# Patient Record
Sex: FEMALE | Race: WHITE | NOT HISPANIC OR LATINO | Employment: UNEMPLOYED | URBAN - NONMETROPOLITAN AREA
[De-identification: names, ages, dates, MRNs, and addresses within clinical notes are randomized per-mention and may not be internally consistent; named-entity substitution may affect disease eponyms.]

---

## 2020-08-21 ENCOUNTER — OFFICE VISIT (OUTPATIENT)
Dept: FAMILY MEDICINE CLINIC | Facility: CLINIC | Age: 25
End: 2020-08-21

## 2020-08-21 VITALS
SYSTOLIC BLOOD PRESSURE: 84 MMHG | HEART RATE: 113 BPM | WEIGHT: 159 LBS | OXYGEN SATURATION: 98 % | HEIGHT: 69 IN | DIASTOLIC BLOOD PRESSURE: 48 MMHG | TEMPERATURE: 97.8 F | BODY MASS INDEX: 23.55 KG/M2

## 2020-08-21 DIAGNOSIS — M79.672 LEFT FOOT PAIN: ICD-10-CM

## 2020-08-21 DIAGNOSIS — K90.0 CELIAC DISEASE: ICD-10-CM

## 2020-08-21 DIAGNOSIS — Z30.09 FAMILY PLANNING: Primary | ICD-10-CM

## 2020-08-21 DIAGNOSIS — D50.8 OTHER IRON DEFICIENCY ANEMIA: ICD-10-CM

## 2020-08-21 PROCEDURE — 86769 SARS-COV-2 COVID-19 ANTIBODY: CPT | Performed by: NURSE PRACTITIONER

## 2020-08-21 PROCEDURE — 36415 COLL VENOUS BLD VENIPUNCTURE: CPT | Performed by: NURSE PRACTITIONER

## 2020-08-21 PROCEDURE — 99203 OFFICE O/P NEW LOW 30 MIN: CPT | Performed by: NURSE PRACTITIONER

## 2020-08-21 RX ORDER — CYANOCOBALAMIN (VITAMIN B-12) 250 MCG
250 TABLET ORAL DAILY
COMMUNITY
End: 2022-07-27 | Stop reason: SDDI

## 2020-08-21 RX ORDER — NORGESTIMATE AND ETHINYL ESTRADIOL 0.25-0.035
1 KIT ORAL DAILY
Qty: 28 TABLET | Refills: 12 | Status: SHIPPED | OUTPATIENT
Start: 2020-08-21

## 2020-08-21 NOTE — PROGRESS NOTES
Subjective   Sandrita Khan is a 25 y.o. female.     Chief Complaint: Establish Care and Anemia    History of Present Illness   Pt is here today to establish care.  She has been following with a provider in [place] and she has now moved to this area.  She is in the .   Patient does have a hx of iron def anemia, celiac disease.   Childbirth in 2017.  Appendectomy 2018.   Colonoscopy and EGD in 2019.  She does have celiac disease.  She does have a hx of anemia.  She is currently taking iron supplementation BiD.  She was told to use B12 otc also.  She takes this every morning.    Last pap smear in June 2020.  Abnormal findings.  Does need a follow up pap done.   She had labs drawn in June 2020 and was told that her labs were perfectly normal now.  She plans to continue taking her iron supplementation.   She is currently on gluten free diet.     Pt states that she had fever, cough, shortness of breath and fatigue about two months ago.  She was tested for influenza and strep which was negative.  She was not tested for coronavirus and would like to be tested for antibodies if possible.     Left foot Pain  She states that she was running and felt that the back of the foot buckled and had pain in the back of her foot.  She continues to have pain in the foot.  Slightly improving but continues to hurt.  She did have some N/T in the bottom of her foot the first few days; but it has resolved now.  She does have pain in the foot with driving also.        Family History   Problem Relation Age of Onset   • Hypertension Mother    • Thyroid cancer Father        Social History     Socioeconomic History   • Marital status:      Spouse name: Not on file   • Number of children: Not on file   • Years of education: Not on file   • Highest education level: Not on file   Tobacco Use   • Smoking status: Never Smoker   • Smokeless tobacco: Never Used   Substance and Sexual Activity   • Alcohol use: Yes     Comment: social   "  • Drug use: Never   • Sexual activity: Not Currently     Birth control/protection: Pill       Past Medical History:   Diagnosis Date   • Abnormal Pap smear of cervix    • Anemia    • Celiac disease    • Nerve damage of foot        Review of Systems   Constitutional: Negative.    HENT: Negative.    Respiratory: Negative.    Cardiovascular: Negative.    Gastrointestinal: Negative.    Musculoskeletal: Negative.    Skin: Negative.    Neurological: Negative.    Psychiatric/Behavioral: Negative.        Objective   Physical Exam   Constitutional: She is oriented to person, place, and time. She appears well-developed and well-nourished.   Neck: Normal range of motion. Neck supple.   Cardiovascular: Normal rate, regular rhythm and normal heart sounds.   Pulmonary/Chest: Effort normal and breath sounds normal.   Musculoskeletal:   Mildly tender achilles tendon   Neurological: She is alert and oriented to person, place, and time.   Skin: Skin is warm and dry.   Psychiatric: She has a normal mood and affect. Her behavior is normal. Judgment and thought content normal.   Nursing note and vitals reviewed.      Procedures    Vitals: Blood pressure (!) 84/48, pulse 113, temperature 97.8 °F (36.6 °C), height 175.3 cm (69\"), weight 72.1 kg (159 lb), SpO2 98 %, not currently breastfeeding.    Body mass index is 23.48 kg/m².     Allergies: No Known Allergies     During this visit the following were done:  Labs Reviewed []    Labs Ordered []    Radiology Reports Reviewed []    Radiology Ordered []    PCP Records Reviewed []    Referring Provider Records Reviewed []    ER Records Reviewed []    Hospital Records Reviewed []    History Obtained From Family []    Radiology Images Reviewed []    Other Reviewed []    Records Requested []      Assessment/Plan   Sandrita was seen today for establish care and anemia.    Diagnoses and all orders for this visit:    Family planning  -     Continue norgestimate-ethinyl estradiol (Sprintec 28) " 0.25-35 MG-MCG per tablet; Take 1 tablet by mouth Daily.    Celiac disease    Other iron deficiency anemia   Continue iron supplementation    Left foot pain   observie for now.    RTC 2 weeks for pap

## 2020-08-22 LAB
ABBOTT SARS-COV-2 AB, IGG: NORMAL
Lab: NEGATIVE
SARS-COV-2 IGM SERPL QL IA: NEGATIVE

## 2020-08-23 LAB — SARS-COV-2 IGA SERPL QL IA: NEGATIVE

## 2020-09-04 ENCOUNTER — OFFICE VISIT (OUTPATIENT)
Dept: FAMILY MEDICINE CLINIC | Facility: CLINIC | Age: 25
End: 2020-09-04

## 2020-09-04 VITALS
DIASTOLIC BLOOD PRESSURE: 38 MMHG | TEMPERATURE: 97.5 F | SYSTOLIC BLOOD PRESSURE: 84 MMHG | BODY MASS INDEX: 23.55 KG/M2 | WEIGHT: 159 LBS | OXYGEN SATURATION: 98 % | HEIGHT: 69 IN | HEART RATE: 82 BPM

## 2020-09-04 DIAGNOSIS — Z87.42 HISTORY OF ABNORMAL CERVICAL PAP SMEAR: Primary | ICD-10-CM

## 2020-09-04 PROCEDURE — 99213 OFFICE O/P EST LOW 20 MIN: CPT | Performed by: NURSE PRACTITIONER

## 2020-09-04 NOTE — PROGRESS NOTES
Subjective   Sandrita Khan is a 25 y.o. female.     Chief Complaint: Gynecologic Exam    Gynecologic Exam   The patient's pertinent negatives include no genital itching, genital lesions, genital odor, genital rash, missed menses, pelvic pain, vaginal bleeding or vaginal discharge. She is not pregnant. Pertinent negatives include no abdominal pain, dysuria, fever or painful intercourse. She is sexually active. No, her partner does not have an STD. She uses oral contraceptives for contraception. Her menstrual history has been regular. Her past medical history is significant for an STD (chlamydia 1.5 years ago). There is no history of a  section, an ectopic pregnancy or miscarriage.   Last pap in  and was found to be abnormal; needed follow up pap in 3 months.    Breast exam normal in .  Rectal exam normal in .   LMP 8/15/2020.         Family History   Problem Relation Age of Onset   • Hypertension Mother    • Thyroid cancer Father        Social History     Socioeconomic History   • Marital status:      Spouse name: Not on file   • Number of children: Not on file   • Years of education: Not on file   • Highest education level: Not on file   Tobacco Use   • Smoking status: Never Smoker   • Smokeless tobacco: Never Used   Substance and Sexual Activity   • Alcohol use: Yes     Comment: social    • Drug use: Never   • Sexual activity: Not Currently     Birth control/protection: Pill       Past Medical History:   Diagnosis Date   • Abnormal Pap smear of cervix    • Anemia    • Celiac disease    • Nerve damage of foot        Review of Systems   Constitutional: Negative.  Negative for fever.   HENT: Negative.    Respiratory: Negative.    Cardiovascular: Negative.    Gastrointestinal: Negative.  Negative for abdominal pain.   Genitourinary: Negative for dysuria, missed menses, pelvic pain and vaginal discharge.   Musculoskeletal: Negative.    Skin: Negative.    Neurological: Negative.   "  Psychiatric/Behavioral: Negative.        Objective   Physical Exam   Constitutional: She is oriented to person, place, and time. She appears well-developed and well-nourished.   Neck: Normal range of motion. Neck supple.   Cardiovascular: Normal rate, regular rhythm and normal heart sounds.   Pulmonary/Chest: Effort normal and breath sounds normal.   Genitourinary: Vagina normal and uterus normal. There is no rash, tenderness or lesion on the right labia. There is no rash, tenderness or lesion on the left labia. Cervix exhibits no motion tenderness, no discharge and no friability.   Neurological: She is alert and oriented to person, place, and time.   Skin: Skin is warm and dry.   Psychiatric: She has a normal mood and affect. Her behavior is normal. Judgment and thought content normal.   Nursing note and vitals reviewed.      Procedures    Vitals: Blood pressure (!) 84/38, pulse 82, temperature 97.5 °F (36.4 °C), height 175.3 cm (69\"), weight 72.1 kg (159 lb), SpO2 98 %, not currently breastfeeding.    Body mass index is 23.48 kg/m².     Allergies: No Known Allergies     During this visit the following were done:  Labs Reviewed []    Labs Ordered []    Radiology Reports Reviewed []    Radiology Ordered []    PCP Records Reviewed []    Referring Provider Records Reviewed []    ER Records Reviewed []    Hospital Records Reviewed []    History Obtained From Family []    Radiology Images Reviewed []    Other Reviewed []    Records Requested []      Assessment/Plan   Sandrita was seen today for gynecologic exam.    Diagnoses and all orders for this visit:    History of abnormal cervical Pap smear  -     Pap IG, Rfx HPV ASCU               "

## 2020-09-09 ENCOUNTER — TELEPHONE (OUTPATIENT)
Dept: FAMILY MEDICINE CLINIC | Facility: CLINIC | Age: 25
End: 2020-09-09

## 2020-09-09 LAB
CONV .: NORMAL
CYTOLOGIST CVX/VAG CYTO: NORMAL
CYTOLOGY CVX/VAG DOC CYTO: NORMAL
CYTOLOGY CVX/VAG DOC THIN PREP: NORMAL
DX ICD CODE: NORMAL
HIV 1 & 2 AB SER-IMP: NORMAL
OTHER STN SPEC: NORMAL
STAT OF ADQ CVX/VAG CYTO-IMP: NORMAL

## 2020-09-09 NOTE — TELEPHONE ENCOUNTER
----- Message from CARY Kaur sent at 9/9/2020  9:51 AM EDT -----  Pap results are normal.  Please let her know.     Patient notified.

## 2022-03-11 ENCOUNTER — TRANSCRIBE ORDERS (OUTPATIENT)
Dept: ADMINISTRATIVE | Facility: HOSPITAL | Age: 27
End: 2022-03-11

## 2022-03-11 ENCOUNTER — HOSPITAL ENCOUNTER (OUTPATIENT)
Dept: CT IMAGING | Facility: HOSPITAL | Age: 27
Discharge: HOME | End: 2022-03-11
Admitting: NURSE PRACTITIONER

## 2022-03-11 DIAGNOSIS — F07.81 POST CONCUSSION SYNDROME: ICD-10-CM

## 2022-03-11 DIAGNOSIS — G44.309 POST-TRAUMATIC HEADACHE, NOT INTRACTABLE, UNSPECIFIED CHRONICITY PATTERN: ICD-10-CM

## 2022-03-11 DIAGNOSIS — S09.90XA HEAD TRAUMA, INITIAL ENCOUNTER: Primary | ICD-10-CM

## 2022-03-11 DIAGNOSIS — S09.90XA HEAD TRAUMA, INITIAL ENCOUNTER: ICD-10-CM

## 2022-03-11 PROCEDURE — 70450 CT HEAD/BRAIN W/O DYE: CPT | Performed by: RADIOLOGY

## 2022-03-11 PROCEDURE — 70450 CT HEAD/BRAIN W/O DYE: CPT

## 2022-07-27 ENCOUNTER — OFFICE VISIT (OUTPATIENT)
Dept: FAMILY MEDICINE CLINIC | Facility: CLINIC | Age: 27
End: 2022-07-27

## 2022-07-27 VITALS
DIASTOLIC BLOOD PRESSURE: 68 MMHG | HEIGHT: 69 IN | SYSTOLIC BLOOD PRESSURE: 100 MMHG | WEIGHT: 162.6 LBS | HEART RATE: 56 BPM | BODY MASS INDEX: 24.08 KG/M2 | OXYGEN SATURATION: 98 % | TEMPERATURE: 96.2 F

## 2022-07-27 DIAGNOSIS — R21 RASH: Primary | ICD-10-CM

## 2022-07-27 DIAGNOSIS — K90.0 CELIAC DISEASE: ICD-10-CM

## 2022-07-27 DIAGNOSIS — Z76.89 ENCOUNTER TO ESTABLISH CARE: ICD-10-CM

## 2022-07-27 PROCEDURE — 99214 OFFICE O/P EST MOD 30 MIN: CPT | Performed by: NURSE PRACTITIONER

## 2022-07-27 RX ORDER — LORATADINE 10 MG/1
CAPSULE, LIQUID FILLED ORAL
COMMUNITY

## 2022-07-27 NOTE — PROGRESS NOTES
Chief Complaint  Establish Care    Subjective          Sandrita Peterson is a 27 y.o. female who presents today to [facility] for initial evaluation     HPI:   History of Present Illness    Presents to clinic to establish care. Also has c/o rash on stomach.  Rash started on stomach about 1 week ago.  Positive pruritus.  Tanya had the same rash about 1.5 years ago.  Was evaluated and diagnosed with food allergic reaction.  Was treated with Claritin and rash resolved.  Tanya has continued to take Claritin daily and has had no recurrence of rash until last week after missing 4 days of her Claritin.  Tanya has restarted Claritin and rash has since improved.  Reports history of celiac disease.  Reports she tries not to consume gluten. Would like allergy testing.  No other concerns or issues at this time.    The following portions of the patient's history were reviewed and updated as appropriate: allergies, current medications, past family history, past medical history, past social history, past surgical history and problem list.    Objective     Allergy:   No Known Allergies     Current Medications:   Current Outpatient Medications   Medication Sig Dispense Refill   • Loratadine 10 MG capsule Take  by mouth.     • norgestimate-ethinyl estradiol (Sprintec 28) 0.25-35 MG-MCG per tablet Take 1 tablet by mouth Daily. 28 tablet 12     No current facility-administered medications for this visit.       Past Medical History:  Past Medical History:   Diagnosis Date   • Abnormal Pap smear of cervix    • Anemia    • Celiac disease    • Nerve damage of foot        Past Surgical History:  Past Surgical History:   Procedure Laterality Date   • APPENDECTOMY     • WISDOM TOOTH EXTRACTION         Social History:  Social History     Socioeconomic History   • Marital status:    Tobacco Use   • Smoking status: Never Smoker   • Smokeless tobacco: Never Used   Vaping Use   • Vaping Use: Never used  "  Substance and Sexual Activity   • Alcohol use: Yes     Comment: social    • Drug use: Never   • Sexual activity: Not Currently     Birth control/protection: Pill       Family History:  Family History   Problem Relation Age of Onset   • Hypertension Mother    • Thyroid cancer Father        Review of Systems:  Review of Systems   Skin: Positive for rash.       Vital Signs:   /68 (BP Location: Right arm, Patient Position: Sitting, Cuff Size: Adult)   Pulse 56   Temp 96.2 °F (35.7 °C) (Temporal)   Ht 175.3 cm (69\")   Wt 73.8 kg (162 lb 9.6 oz)   SpO2 98%   BMI 24.01 kg/m²  RR: 15    Physical Exam:  Physical Exam  Vitals and nursing note reviewed.   Constitutional:       General: She is not in acute distress.     Appearance: Normal appearance. She is not ill-appearing or toxic-appearing.   HENT:      Head: Normocephalic.   Cardiovascular:      Rate and Rhythm: Normal rate and regular rhythm.      Heart sounds: Normal heart sounds.   Pulmonary:      Effort: Pulmonary effort is normal. No respiratory distress.      Breath sounds: Normal breath sounds.   Skin:     General: Skin is warm and dry.      Coloration: Skin is not pale.      Comments: Erythematous papular rash noted to bilateral lower abdomen. Some scabbed lesions. Some healing lesions with hyperpigmented changes.    Neurological:      Mental Status: She is alert and oriented to person, place, and time.   Psychiatric:         Mood and Affect: Mood normal.         Behavior: Behavior normal.         Thought Content: Thought content normal.         Judgment: Judgment normal.                  Assessment and Plan   Diagnoses and all orders for this visit:    1. Rash (Primary)  -     Ambulatory Referral to Allergy  Referred for allergy testing per patient request.  Can continue Claritin.    2. Celiac disease  Avoid gluten in diet    3. Encounter to establish care        Discussed possible differential diagnoses, testing, treatment, recommended " non-pharmacological interventions, risks, warning signs to monitor for that would indicate need for follow-up in clinic or ER. If no improvement with these regimens or you have new or worsening symptoms follow-up. Patient verbalizes understanding and agreement with plan of care. Denies further needs or concerns.     Patient was given instructions and counseling regarding her condition and for health maintenance advised.    BMI is within normal parameters. No other follow-up for BMI required.       I spent 30 minutes caring for patient on this date of service. This time includes time spent by me in the following activities: preparing for the visit, reviewing tests, obtaining and/or reviewing a separately obtained history, performing a medically appropriate examination and/or evaluation, counseling and educating the patient/family/caregiver, ordering medications, tests, or procedures and documenting information in the medical record    Meds ordered during this visit:  No orders of the defined types were placed in this encounter.      Patient Instructions:  Patient instructions given for the following visit diagnosis:    ICD-10-CM ICD-9-CM   1. Rash  R21 782.1   2. Celiac disease  K90.0 579.0   3. Encounter to establish care  Z76.89 V65.8       Follow Up   Return if symptoms worsen or fail to improve, for Annual.        This document has been electronically signed by CARY Peck  July 27, 2022 15:04 EDT    Patient was given instructions and counseling regarding her condition or for health maintenance advice. Please see specific information pulled into the AVS if appropriate.     Part of this note may be an electronic transcription/translation of spoken language to printed text using the Dragon Dictation System.